# Patient Record
Sex: MALE | Race: WHITE | URBAN - METROPOLITAN AREA
[De-identification: names, ages, dates, MRNs, and addresses within clinical notes are randomized per-mention and may not be internally consistent; named-entity substitution may affect disease eponyms.]

---

## 2020-04-27 ENCOUNTER — APPOINTMENT (RX ONLY)
Dept: URBAN - METROPOLITAN AREA CLINIC 174 | Facility: CLINIC | Age: 25
Setting detail: DERMATOLOGY
End: 2020-04-27

## 2020-04-27 DIAGNOSIS — R17 UNSPECIFIED JAUNDICE: ICD-10-CM

## 2020-04-27 PROBLEM — L30.9 DERMATITIS, UNSPECIFIED: Status: ACTIVE | Noted: 2020-04-27

## 2020-04-27 PROCEDURE — ? ORDER TESTS

## 2020-04-27 PROCEDURE — 99202 OFFICE O/P NEW SF 15 MIN: CPT | Mod: 95

## 2020-04-27 PROCEDURE — ? ADDITIONAL NOTES

## 2020-04-27 ASSESSMENT — LOCATION ZONE DERM: LOCATION ZONE: HAND

## 2020-04-27 ASSESSMENT — LOCATION SIMPLE DESCRIPTION DERM
LOCATION SIMPLE: RIGHT HAND
LOCATION SIMPLE: LEFT HAND

## 2020-04-27 ASSESSMENT — LOCATION DETAILED DESCRIPTION DERM
LOCATION DETAILED: RIGHT ULNAR PALM
LOCATION DETAILED: LEFT THENAR EMINENCE
LOCATION DETAILED: LEFT ULNAR DORSAL HAND
LOCATION DETAILED: RIGHT RADIAL DORSAL HAND

## 2020-04-27 ASSESSMENT — SEVERITY ASSESSMENT: SEVERITY: MILD

## 2020-04-27 NOTE — HPI: DISCOLORATION
How Severe Is Your Skin Discoloration?: moderate
Additional History: Patient finished Isotretinoin course 3 years ago and states at a recent physical his  had noticed a yellow discoloration on his hands and advised patient get lab-work done to ensure liver enzymes were all in check. Patient states his Acne has improved and he has noticed some other residual side effects such as dryness and dry eyes. States he has a few headaches that come and go but no double vision.

## 2020-04-27 NOTE — PROCEDURE: ADDITIONAL NOTES
Detail Level: Simple
Additional Notes: Patient states since he got off Isotretinoin 3 years ago he has been somewhat dry but his main complaint is that a Dr. noticed his hands were yellowish and was concerned for Jaundice at his physical. Advised patient see us for further workup. Discussed with patient and had patient send photos via secure email which are added to chart. Advised hands look normal and not overtly yellow or jaundiced. Emailed labs for liver function, hepatitis, CBC and lipid panel for patient to complete. Will notify patient of results if any abnormalities. Patient states he was eating high amounts of carrots and does drink moderately. Recommended patient discontinue all potential factors for elevated Liver enzymes until workup is complete. Patient understands and agrees with plan.
Additional Notes: After discussion of the risks, benefits and limitations with respect to this Telehealth visit, including potential limitations in picture and video quality, the patient has given verbal consent to proceed with this Telehealth visit. Interactive audio and video telecommunications were used to permit real-time communication between myself and the patient.  This Telehealth visit was performed due to the national COVID-19 emergency and recommended social distancing.